# Patient Record
Sex: MALE | Race: WHITE | NOT HISPANIC OR LATINO
[De-identification: names, ages, dates, MRNs, and addresses within clinical notes are randomized per-mention and may not be internally consistent; named-entity substitution may affect disease eponyms.]

---

## 2018-08-01 ENCOUNTER — RECORDS - HEALTHEAST (OUTPATIENT)
Dept: ADMINISTRATIVE | Facility: OTHER | Age: 83
End: 2018-08-01

## 2018-08-13 ASSESSMENT — MIFFLIN-ST. JEOR: SCORE: 1687.82

## 2018-08-16 ENCOUNTER — SURGERY - HEALTHEAST (OUTPATIENT)
Dept: SURGERY | Facility: CLINIC | Age: 83
End: 2018-08-16

## 2018-08-16 ENCOUNTER — ANESTHESIA - HEALTHEAST (OUTPATIENT)
Dept: SURGERY | Facility: CLINIC | Age: 83
End: 2018-08-16

## 2018-08-16 ASSESSMENT — MIFFLIN-ST. JEOR: SCORE: 1647

## 2021-02-02 ENCOUNTER — AMBULATORY - HEALTHEAST (OUTPATIENT)
Dept: NURSING | Facility: CLINIC | Age: 86
End: 2021-02-02

## 2021-02-23 ENCOUNTER — AMBULATORY - HEALTHEAST (OUTPATIENT)
Dept: NURSING | Facility: CLINIC | Age: 86
End: 2021-02-23

## 2021-05-26 ENCOUNTER — RECORDS - HEALTHEAST (OUTPATIENT)
Dept: ADMINISTRATIVE | Facility: CLINIC | Age: 86
End: 2021-05-26

## 2021-06-01 ENCOUNTER — RECORDS - HEALTHEAST (OUTPATIENT)
Dept: ADMINISTRATIVE | Facility: CLINIC | Age: 86
End: 2021-06-01

## 2021-06-01 VITALS — WEIGHT: 191 LBS | HEIGHT: 75 IN | BODY MASS INDEX: 23.75 KG/M2

## 2021-06-16 PROBLEM — F51.01 PRIMARY INSOMNIA: Chronic | Status: ACTIVE | Noted: 2018-08-16

## 2021-06-16 PROBLEM — M16.9 DEGENERATIVE ARTHRITIS OF HIP: Status: ACTIVE | Noted: 2018-08-16

## 2021-06-16 PROBLEM — E29.1 HYPOGONADISM MALE: Chronic | Status: ACTIVE | Noted: 2018-08-16

## 2021-06-16 PROBLEM — E88.810 METABOLIC SYNDROME X: Chronic | Status: ACTIVE | Noted: 2018-08-16

## 2021-06-16 PROBLEM — I10 BENIGN ESSENTIAL HYPERTENSION: Chronic | Status: ACTIVE | Noted: 2018-08-16

## 2021-06-19 NOTE — ANESTHESIA PREPROCEDURE EVALUATION
Anesthesia Evaluation      Patient summary reviewed   No history of anesthetic complications     Airway   Mallampati: II  Neck ROM: full   Pulmonary - negative ROS and normal exam                          Cardiovascular - normal exam  (+) hypertension, ,      Neuro/Psych    (+) chronic pain    Endo/Other    (+) diabetes mellitus, arthritis,      GI/Hepatic/Renal - negative ROS           Dental - normal exam                        Anesthesia Plan  Planned anesthetic: spinal    ASA 2     Anesthetic plan and risks discussed with: patient    Post-op plan: routine recovery

## 2021-06-19 NOTE — ANESTHESIA CARE TRANSFER NOTE
Last vitals:   Vitals:    08/16/18 1431   BP: 113/55   Pulse: (!) 58   Resp: 14   Temp: 36.7  C (98  F)   SpO2: 94%     Patient's level of consciousness is awake  Spontaneous respirations: yes  Maintains airway independently: yes  Dentition unchanged: yes  Oropharynx: oropharynx clear of all foreign objects    QCDR Measures:  ASA# 20 - Surgical Safety Checklist: WHO surgical safety checklist completed prior to induction  PQRS# 430 - Adult PONV Prevention: 4558F - Pt received => 2 anti-emetic agents (different classes) preop & intraop  ASA# 8 - Peds PONV Prevention: NA - Not pediatric patient, not GA or 2 or more risk factors NOT present  PQRS# 424 - Elizabeth-op Temp Management: 4559F - At least one body temp DOCUMENTED => 35.5C or 95.9F within required timeframe  PQRS# 426 - PACU Transfer Protocol: - Transfer of care checklist used  ASA# 14 - Acute Post-op Pain: ASA14B - Patient did NOT experience pain >= 7 out of 10

## 2021-06-19 NOTE — ANESTHESIA PROCEDURE NOTES
Spinal Block    Patient location during procedure: OR  Start time: 8/16/2018 11:49 AM  End time: 8/16/2018 11:53 AM  Reason for block: primary anesthetic    Staffing:  Performing  Anesthesiologist: ABDIRIZAK CORREA    Preanesthetic Checklist  Completed: patient identified, risks, benefits, and alternatives discussed, timeout performed, consent obtained, airway assessed, oxygen available, suction available, emergency drugs available and hand hygiene performed  Spinal Block  Patient position: sitting  Prep: ChloraPrep and site prepped and draped  Patient monitoring: blood pressure, continuous pulse ox, cardiac monitor and heart rate  Approach: right paramedian  Location: L3-4  Injection technique: single-shot  Needle type: pencil-tip   Needle gauge: 25 G

## 2021-06-19 NOTE — ANESTHESIA POSTPROCEDURE EVALUATION
Patient: Ilir Lovelace   RIGHT MINIMALLY INVASIVE TOTAL HIP ARTHROPLASTY  Anesthesia type: spinal    Patient location: PACU  Last vitals:   Vitals:    08/16/18 1450   BP: 128/59   Pulse: (!) 57   Resp: 16   Temp:    SpO2: 96%     Post vital signs: stable  Level of consciousness: awake and responds to simple questions  Post-anesthesia pain: pain controlled  Post-anesthesia nausea and vomiting: no  Pulmonary: unassisted, return to baseline  Cardiovascular: stable and blood pressure at baseline  Hydration: adequate  Anesthetic events: no    QCDR Measures:  ASA# 11 - Elizabeth-op Cardiac Arrest: ASA11B - Patient did NOT experience unanticipated cardiac arrest  ASA# 12 - Elizabeth-op Mortality Rate: ASA12B - Patient did NOT die  ASA# 13 - PACU Re-Intubation Rate: NA - No ETT / LMA used for case  ASA# 10 - Composite Anes Safety: ASA10A - No serious adverse event    Additional Notes: doing well, no complaints.

## 2024-12-09 ENCOUNTER — LAB (OUTPATIENT)
Dept: LAB | Facility: CLINIC | Age: 89
End: 2024-12-09
Payer: COMMERCIAL

## 2024-12-09 DIAGNOSIS — R60.9 EDEMA: Primary | ICD-10-CM

## 2024-12-09 LAB
ANION GAP SERPL CALCULATED.3IONS-SCNC: 11 MMOL/L (ref 7–15)
BUN SERPL-MCNC: 15.3 MG/DL (ref 8–23)
CALCIUM SERPL-MCNC: 9.6 MG/DL (ref 8.8–10.4)
CHLORIDE SERPL-SCNC: 102 MMOL/L (ref 98–107)
CREAT SERPL-MCNC: 0.94 MG/DL (ref 0.67–1.17)
EGFRCR SERPLBLD CKD-EPI 2021: 78 ML/MIN/1.73M2
GLUCOSE SERPL-MCNC: 99 MG/DL (ref 70–99)
HCO3 SERPL-SCNC: 27 MMOL/L (ref 22–29)
MAGNESIUM SERPL-MCNC: 2.2 MG/DL (ref 1.7–2.3)
POTASSIUM SERPL-SCNC: 4.1 MMOL/L (ref 3.4–5.3)
SODIUM SERPL-SCNC: 140 MMOL/L (ref 135–145)
TSH SERPL DL<=0.005 MIU/L-ACNC: 3.33 UIU/ML (ref 0.3–4.2)

## 2024-12-09 PROCEDURE — 36415 COLL VENOUS BLD VENIPUNCTURE: CPT

## 2024-12-09 PROCEDURE — 83735 ASSAY OF MAGNESIUM: CPT

## 2024-12-09 PROCEDURE — 80048 BASIC METABOLIC PNL TOTAL CA: CPT

## 2024-12-09 PROCEDURE — 84443 ASSAY THYROID STIM HORMONE: CPT

## 2025-02-06 ENCOUNTER — LAB (OUTPATIENT)
Dept: LAB | Facility: CLINIC | Age: 89
End: 2025-02-06
Payer: COMMERCIAL

## 2025-02-06 DIAGNOSIS — R00.1 BRADYCARDIA: Primary | ICD-10-CM

## 2025-02-06 LAB
ANION GAP SERPL CALCULATED.3IONS-SCNC: 15 MMOL/L (ref 7–15)
BUN SERPL-MCNC: 13.6 MG/DL (ref 8–23)
CALCIUM SERPL-MCNC: 9.1 MG/DL (ref 8.8–10.4)
CHLORIDE SERPL-SCNC: 103 MMOL/L (ref 98–107)
CREAT SERPL-MCNC: 0.8 MG/DL (ref 0.67–1.17)
EGFRCR SERPLBLD CKD-EPI 2021: 86 ML/MIN/1.73M2
GLUCOSE SERPL-MCNC: 123 MG/DL (ref 70–99)
HCO3 SERPL-SCNC: 20 MMOL/L (ref 22–29)
MAGNESIUM SERPL-MCNC: 1.9 MG/DL (ref 1.7–2.3)
POTASSIUM SERPL-SCNC: 4 MMOL/L (ref 3.4–5.3)
SODIUM SERPL-SCNC: 138 MMOL/L (ref 135–145)

## 2025-02-13 ENCOUNTER — TRANSCRIBE ORDERS (OUTPATIENT)
Dept: OTHER | Age: 89
End: 2025-02-13

## 2025-02-13 ENCOUNTER — MEDICAL CORRESPONDENCE (OUTPATIENT)
Dept: HEALTH INFORMATION MANAGEMENT | Facility: CLINIC | Age: 89
End: 2025-02-13
Payer: COMMERCIAL

## 2025-02-13 DIAGNOSIS — J84.9 ILD (INTERSTITIAL LUNG DISEASE) (H): Primary | ICD-10-CM

## 2025-02-13 DIAGNOSIS — R05.9 COUGH: ICD-10-CM

## 2025-02-27 ENCOUNTER — TRANSFERRED RECORDS (OUTPATIENT)
Dept: HEALTH INFORMATION MANAGEMENT | Facility: CLINIC | Age: 89
End: 2025-02-27
Payer: COMMERCIAL

## 2025-03-06 ENCOUNTER — TELEPHONE (OUTPATIENT)
Dept: PULMONOLOGY | Facility: CLINIC | Age: 89
End: 2025-03-06
Payer: COMMERCIAL

## 2025-03-06 NOTE — TELEPHONE ENCOUNTER
Left Voicemail (1st Attempt) for the patient to call back and schedule the following:    Appointment type: New Pulm  Provider: NA  Return date: Next Avail  Specialty phone number: [phone]  Additional appointment(s) needed: full pft  Additonal Notes: per ILD review, start gen pulm

## 2025-03-10 DIAGNOSIS — J84.9 ILD (INTERSTITIAL LUNG DISEASE) (H): Primary | ICD-10-CM

## 2025-03-10 DIAGNOSIS — R05.9 COUGH: ICD-10-CM

## 2025-04-15 ENCOUNTER — OFFICE VISIT (OUTPATIENT)
Dept: PULMONOLOGY | Facility: CLINIC | Age: 89
End: 2025-04-15
Payer: COMMERCIAL

## 2025-04-15 VITALS
WEIGHT: 195 LBS | SYSTOLIC BLOOD PRESSURE: 144 MMHG | HEIGHT: 75 IN | OXYGEN SATURATION: 95 % | DIASTOLIC BLOOD PRESSURE: 70 MMHG | HEART RATE: 89 BPM | BODY MASS INDEX: 24.25 KG/M2

## 2025-04-15 DIAGNOSIS — J31.0 CHRONIC RHINITIS: ICD-10-CM

## 2025-04-15 DIAGNOSIS — J84.9 ILD (INTERSTITIAL LUNG DISEASE) (H): ICD-10-CM

## 2025-04-15 DIAGNOSIS — J31.0 CHRONIC RHINITIS: Primary | ICD-10-CM

## 2025-04-15 DIAGNOSIS — J18.9 PNEUMONIA OF RIGHT LOWER LOBE DUE TO INFECTIOUS ORGANISM: Primary | ICD-10-CM

## 2025-04-15 DIAGNOSIS — R05.9 COUGH: ICD-10-CM

## 2025-04-15 LAB
DLCOCOR-%PRED-PRE: 84 %
DLCOCOR-PRE: 22.76 ML/MIN/MMHG
DLCOUNC-%PRED-PRE: 85 %
DLCOUNC-PRE: 23.08 ML/MIN/MMHG
DLCOUNC-PRED: 26.88 ML/MIN/MMHG
ERV-%PRED-PRE: 48 %
ERV-PRE: 0.73 L
ERV-PRED: 1.51 L
EXPTIME-PRE: 3.64 SEC
FEF2575-%PRED-PRE: 176 %
FEF2575-PRE: 3.37 L/SEC
FEF2575-PRED: 1.91 L/SEC
FEFMAX-%PRED-PRE: 106 %
FEFMAX-PRE: 7.89 L/SEC
FEFMAX-PRED: 7.4 L/SEC
FEV1-%PRED-PRE: 97 %
FEV1-PRE: 2.82 L
FEV1FEV6-PRE: 87 %
FEV1FEV6-PRED: 75 %
FEV1FVC-PRE: 87 %
FEV1FVC-PRED: 74 %
FEV1SVC-PRE: 78 %
FEV1SVC-PRED: 68 %
FIFMAX-PRE: 5.17 L/SEC
FVC-%PRED-PRE: 81 %
FVC-PRE: 3.24 L
FVC-PRED: 3.98 L
HGB BLD-MCNC: 15.1 G/DL
IC-%PRED-PRE: 96 %
IC-PRE: 2.91 L
IC-PRED: 3.02 L
VA-%PRED-PRE: 78 %
VA-PRE: 5.81 L
VC-%PRED-PRE: 85 %
VC-PRE: 3.64 L
VC-PRED: 4.25 L

## 2025-04-15 PROCEDURE — G2211 COMPLEX E/M VISIT ADD ON: HCPCS | Performed by: INTERNAL MEDICINE

## 2025-04-15 PROCEDURE — 85018 HEMOGLOBIN: CPT | Mod: QW

## 2025-04-15 PROCEDURE — 3077F SYST BP >= 140 MM HG: CPT | Performed by: INTERNAL MEDICINE

## 2025-04-15 PROCEDURE — 99203 OFFICE O/P NEW LOW 30 MIN: CPT | Mod: 25 | Performed by: INTERNAL MEDICINE

## 2025-04-15 PROCEDURE — 3078F DIAST BP <80 MM HG: CPT | Performed by: INTERNAL MEDICINE

## 2025-04-15 RX ORDER — FLUTICASONE PROPIONATE 50 MCG
2 SPRAY, SUSPENSION (ML) NASAL DAILY
Qty: 16 G | Refills: 12 | Status: SHIPPED | OUTPATIENT
Start: 2025-04-15

## 2025-04-19 LAB
DLCOCOR-%PRED-PRE: 84 %
DLCOCOR-PRE: 22.76 ML/MIN/MMHG
DLCOUNC-%PRED-PRE: 85 %
DLCOUNC-PRE: 23.08 ML/MIN/MMHG
DLCOUNC-PRED: 26.88 ML/MIN/MMHG
ERV-%PRED-PRE: 48 %
ERV-PRE: 0.73 L
ERV-PRED: 1.51 L
EXPTIME-PRE: 3.64 SEC
FEF2575-%PRED-PRE: 176 %
FEF2575-PRE: 3.37 L/SEC
FEF2575-PRED: 1.91 L/SEC
FEFMAX-%PRED-PRE: 106 %
FEFMAX-PRE: 7.89 L/SEC
FEFMAX-PRED: 7.4 L/SEC
FEV1-%PRED-PRE: 97 %
FEV1-PRE: 2.82 L
FEV1FEV6-PRE: 87 %
FEV1FEV6-PRED: 75 %
FEV1FVC-PRE: 87 %
FEV1FVC-PRED: 74 %
FEV1SVC-PRE: 78 %
FEV1SVC-PRED: 68 %
FIFMAX-PRE: 5.17 L/SEC
FVC-%PRED-PRE: 81 %
FVC-PRE: 3.24 L
FVC-PRED: 3.98 L
IC-%PRED-PRE: 96 %
IC-PRE: 2.91 L
IC-PRED: 3.02 L
VA-%PRED-PRE: 78 %
VA-PRE: 5.81 L
VC-%PRED-PRE: 85 %
VC-PRE: 3.64 L
VC-PRED: 4.25 L

## 2025-04-30 NOTE — PROGRESS NOTES
PULMONARY OUTPATIENT CONSULT NOTE        Assessment:      S/p treatment of RLL pneumonia  Chest CT scan 2/27/2025 showed subtle R basal infiltrate and very small right pleural effusion.   Treated with 10 day course of doxycycline.   Patient reports resolution of acute respiratory symptoms. No systemic symptoms. Clear lung fields on exam. PFTs showed normal spirometry and diffusion capacity.    Denies swallowing problems, denies acid reflux.   Chronic rhinitis   Continue nasal steroids.        Plan:      Flonase two sprays each nostril daily   Follow up as needed        Carlos Stratton  Pulmonary / Critical Care  April 15, 2025        CC:     Chief Complaint   Patient presents with    Consult     J84.9 (ICD-10-CM) - ILD (interstitial lung disease) (H)    R05.9 (ICD-10-CM) - Cough           HPI:       Ilir Lovelace is a 89 year old male who presents for evaluation of abnormal chest CT scan   Patient has history of allergic rhinitis, hiatal hernia, BPH, back and hip surgery.   Patient was seen by PCP for productive cough, weakness. Chest CT scan showed RLL infiltrate and small right effusion. Treated for pneumonia with doxycycline.   Improvement of respiratory symptoms. Denies chest pain.   Denies hemoptysis.   Denies fever, chills or night sweats.   Denies exertional dyspnea , mild chronic dry cough, denies wheezes.   Denies problems swallowing. Reports nasal congestion and postnasal drip.   Previously treated for sinus infection.  Denies history of asthma, reports mild outdoor allergies.   Denies use of inhalers.   Deneis chest pain, orthopnea, PND, or swelling of legs.  No sleeping problems.   No tobacco use in the past.         Past Medical History :     allergic rhinitis, hiatal hernia, back and hip surgery.      Medications:     Current Outpatient Medications   Medication Sig Dispense Refill    fluticasone (FLONASE) 50 MCG/ACT nasal spray Spray 2 sprays into both nostrils daily. 16 g 12     acetaminophen (TYLENOL) 500 MG tablet [ACETAMINOPHEN (TYLENOL) 500 MG TABLET] Take 2 tablets (1,000 mg total) by mouth 3 (three) times a day.  0    aspirin 325 MG EC tablet [ASPIRIN 325 MG EC TABLET] Take 1 tablet (325 mg total) by mouth 2 (two) times a day with meals. 80 tablet 0    bisacodyl (DULCOLAX) 10 mg suppository [BISACODYL (DULCOLAX) 10 MG SUPPOSITORY] Take if no bowel movement in 2- 3 days for relief of constipation.  0    Lactobacillus rhamnosus GG (CULTURELLE) 10-15 Billion cell capsule [LACTOBACILLUS RHAMNOSUS GG (CULTURELLE) 10-15 BILLION CELL CAPSULE] Take 1 capsule by mouth see administration instructions. THREE TIMES A WEEK ([facility] [place])      melatonin 3 mg Tab tablet [MELATONIN 3 MG TAB TABLET] Take 6 mg by mouth at bedtime as needed.      metFORMIN (GLUCOPHAGE) 500 MG tablet [METFORMIN (GLUCOPHAGE) 500 MG TABLET] Take 500 mg by mouth 2 (two) times a day with meals.      NON FORMULARY [NON FORMULARY] Take 1 Dose by mouth as needed. Patient takes multiple OTC herbal meds listed as follows: Lipoic Acid,, Berberine, CBD oil, Chromium picolinate, Neogenis 40 lozenges, Alphabase, Methyl CPG , trimethylglycine,  SuperOmega, Prostatrol forte(Ortho), Ubiquinol, Mitochond opt, DHEA, OncoPlex, Vitamin C Powder.       polyethylene glycol (MIRALAX) 17 gram packet [POLYETHYLENE GLYCOL (MIRALAX) 17 GRAM PACKET] Take 1 packet (17 g total) by mouth daily.  0    senna-docusate (PERICOLACE) 8.6-50 mg tablet [SENNA-DOCUSATE (PERICOLACE) 8.6-50 MG TABLET] Take 1 tablet by mouth 2 (two) times a day as needed for constipation.  0    tadalafil (CIALIS) 10 MG tablet [TADALAFIL (CIALIS) 10 MG TABLET] Take 10 mg by mouth every morning.      terazosin HCl (TERAZOSIN ORAL) [TERAZOSIN HCL (TERAZOSIN ORAL)] Take 10 mg by mouth 2 (two) times a day.       traMADol (ULTRAM) 50 mg tablet [TRAMADOL (ULTRAM) 50 MG TABLET] Take 1 tablet (50 mg total) by mouth every 6 (six) hours as needed for pain. 50 tablet 0    UNABLE TO FIND [UNABLE TO  "FIND] Take 300 mg by mouth 2 (two) times a day. Med Name: VERAPAMIL ER  MG CAPSULE      UNABLE TO FIND [UNABLE TO FIND] Apply 1 mL topically daily. Med Name: COMPOUNDED TOPICAL GEL: \"P 12/T 160 G IN 25% DMSO GEL\"  : APPLY 1 ML TOPICALLY DAILY OR AS DIRECTED       zinc 50 mg Tab [ZINC 50 MG TAB] Take 50 mg by mouth see administration instructions. TAKES 2 OR THREE TIMES PER WEEK.       No current facility-administered medications for this visit.        Social History :     Social History     Socioeconomic History    Marital status:      Spouse name: Not on file    Number of children: Not on file    Years of education: Not on file    Highest education level: Not on file   Occupational History    Not on file   Tobacco Use    Smoking status: Never     Passive exposure: Past (Dad was a smoker)    Smokeless tobacco: Never   Vaping Use    Vaping status: Never Used   Substance and Sexual Activity    Alcohol use: Yes     Comment: Alcoholic Drinks/day: weekly    Drug use: No    Sexual activity: Not on file   Other Topics Concern    Not on file   Social History Narrative    Not on file     Social Drivers of Health     Financial Resource Strain: Not on file   Food Insecurity: Not on file   Transportation Needs: Not on file   Physical Activity: Not on file   Stress: Not on file   Social Connections: Not on file   Interpersonal Safety: Not on file   Housing Stability: Not on file          Family History :     No family history on file.    Review of Systems  A 12 point comprehensive review of systems was negative except as noted.        Objective:     BP (!) 144/70 (BP Location: Left arm, Patient Position: Chair, Cuff Size: Adult Regular)   Pulse 89   Ht 1.905 m (6' 3\")   Wt 88.5 kg (195 lb)   SpO2 95%   BMI 24.37 kg/m        Gen: awake, alert, no distress  HEENT: pink conjunctiva, moist mucosa, Mallampati II/IV  Neck: no thyromegaly, masses or JVD  Lungs: clear  CV: regular, no murmurs or gallops " appreciated  Abdomen: soft, NT, BS wnl  Ext: no edema  Neuro: CN II-XII intact, non focal      Diagnostic tests:         PFTs: (4/15/2025)     FVC  3.24 L (81%)  FEV1  2.82 L (97%)  FEV1/FVC 87  Normal flow volume loop  DLCO  85%     IMAGES:     Chest CT scan 2/27/2025    Subtle infiltrate right lower lobe and small right pleural effusion.   No lung nodules  No abnormal mediastinal adenopathy